# Patient Record
Sex: FEMALE | Race: WHITE
[De-identification: names, ages, dates, MRNs, and addresses within clinical notes are randomized per-mention and may not be internally consistent; named-entity substitution may affect disease eponyms.]

---

## 2022-01-21 ENCOUNTER — HOSPITAL ENCOUNTER (OUTPATIENT)
Dept: HOSPITAL 75 - RAD | Age: 72
End: 2022-01-21
Attending: NURSE PRACTITIONER
Payer: MEDICARE

## 2022-01-21 DIAGNOSIS — R41.841: Primary | ICD-10-CM

## 2022-01-21 LAB
ALBUMIN SERPL-MCNC: 4.1 GM/DL (ref 3.2–4.5)
ALP SERPL-CCNC: 84 U/L (ref 40–136)
ALT SERPL-CCNC: 31 U/L (ref 0–55)
BILIRUB SERPL-MCNC: 0.4 MG/DL (ref 0.1–1)
BUN/CREAT SERPL: 28
CALCIUM SERPL-MCNC: 9.2 MG/DL (ref 8.5–10.1)
CHLORIDE SERPL-SCNC: 103 MMOL/L (ref 98–107)
CO2 SERPL-SCNC: 24 MMOL/L (ref 21–32)
CREAT SERPL-MCNC: 0.81 MG/DL (ref 0.6–1.3)
GFR SERPLBLD BASED ON 1.73 SQ M-ARVRAT: 77 ML/MIN
GLUCOSE SERPL-MCNC: 155 MG/DL (ref 70–105)
HCT VFR BLD CALC: 44 % (ref 35–52)
HGB BLD-MCNC: 14.6 G/DL (ref 11.5–16)
MCH RBC QN AUTO: 32 PG (ref 25–34)
MCHC RBC AUTO-ENTMCNC: 33 G/DL (ref 32–36)
MCV RBC AUTO: 96 FL (ref 80–99)
PLATELET # BLD: 278 10^3/UL (ref 130–400)
PMV BLD AUTO: 9.3 FL (ref 9–12.2)
POTASSIUM SERPL-SCNC: 3.5 MMOL/L (ref 3.6–5)
PROT SERPL-MCNC: 7.3 GM/DL (ref 6.4–8.2)
SODIUM SERPL-SCNC: 138 MMOL/L (ref 135–145)
WBC # BLD AUTO: 6.8 10^3/UL (ref 4.3–11)

## 2022-01-21 PROCEDURE — 71046 X-RAY EXAM CHEST 2 VIEWS: CPT

## 2022-01-21 PROCEDURE — 85027 COMPLETE CBC AUTOMATED: CPT

## 2022-01-21 PROCEDURE — 80053 COMPREHEN METABOLIC PANEL: CPT

## 2022-01-21 PROCEDURE — 36415 COLL VENOUS BLD VENIPUNCTURE: CPT

## 2022-01-21 PROCEDURE — 87804 INFLUENZA ASSAY W/OPTIC: CPT

## 2022-01-21 NOTE — DIAGNOSTIC IMAGING REPORT
INDICATION:  DECREASED COGNITION.  



TECHNIQUE:  Two view chest   4:50 PM.



CORRELATION STUDY:   None



FINDINGS: 

The heart size, mediastinal configuration and pulmonary

vasculature are within normal limits. Electronic device over the

left anterior lower chest.

The lungs are clear with no consolidating infiltrate. There is no

significant pleural effusion or pneumothorax.  

Rightward curvature of the thoracolumbar spine with degenerative

change.



IMPRESSION: 

1. Negative for acute abnormality of the chest.



Dictated by: 



  Dictated on workstation # [id number]

## 2022-01-23 ENCOUNTER — HOSPITAL ENCOUNTER (EMERGENCY)
Dept: HOSPITAL 75 - ER | Age: 72
Discharge: HOME | End: 2022-01-23
Payer: MEDICARE

## 2022-01-23 VITALS — BODY MASS INDEX: 26.95 KG/M2 | HEIGHT: 62.99 IN | WEIGHT: 152.12 LBS | DIASTOLIC BLOOD PRESSURE: 61 MMHG

## 2022-01-23 VITALS — SYSTOLIC BLOOD PRESSURE: 61 MMHG

## 2022-01-23 DIAGNOSIS — M25.561: Primary | ICD-10-CM

## 2022-01-23 LAB
ALBUMIN SERPL-MCNC: 3.4 GM/DL (ref 3.2–4.5)
ALP SERPL-CCNC: 83 U/L (ref 40–136)
ALT SERPL-CCNC: 19 U/L (ref 0–55)
APTT BLD: 29 SEC (ref 24–35)
APTT PPP: YELLOW S
BACTERIA #/AREA URNS HPF: NEGATIVE /HPF
BASOPHILS # BLD AUTO: 0 10^3/UL (ref 0–0.1)
BASOPHILS NFR BLD AUTO: 0 % (ref 0–10)
BILIRUB SERPL-MCNC: 0.6 MG/DL (ref 0.1–1)
BILIRUB UR QL STRIP: NEGATIVE
BUN/CREAT SERPL: 15
CALCIUM SERPL-MCNC: 8.4 MG/DL (ref 8.5–10.1)
CHLORIDE SERPL-SCNC: 110 MMOL/L (ref 98–107)
CO2 SERPL-SCNC: 23 MMOL/L (ref 21–32)
CREAT SERPL-MCNC: 0.74 MG/DL (ref 0.6–1.3)
EOSINOPHIL # BLD AUTO: 0.2 10^3/UL (ref 0–0.3)
EOSINOPHIL NFR BLD AUTO: 3 % (ref 0–10)
FIBRINOGEN PPP-MCNC: CLEAR MG/DL
GFR SERPLBLD BASED ON 1.73 SQ M-ARVRAT: 86 ML/MIN
GLUCOSE SERPL-MCNC: 124 MG/DL (ref 70–105)
GLUCOSE UR STRIP-MCNC: NEGATIVE MG/DL
HCT VFR BLD CALC: 43 % (ref 35–52)
HGB BLD-MCNC: 14.2 G/DL (ref 11.5–16)
INR PPP: 1 (ref 0.8–1.4)
KETONES UR QL STRIP: NEGATIVE
LEUKOCYTE ESTERASE UR QL STRIP: NEGATIVE
LYMPHOCYTES # BLD AUTO: 2.3 X 10^3 (ref 1–4)
LYMPHOCYTES NFR BLD AUTO: 36 % (ref 12–44)
MANUAL DIFFERENTIAL PERFORMED BLD QL: NO
MCH RBC QN AUTO: 32 PG (ref 25–34)
MCHC RBC AUTO-ENTMCNC: 33 G/DL (ref 32–36)
MCV RBC AUTO: 97 FL (ref 80–99)
MONOCYTES # BLD AUTO: 0.6 X 10^3 (ref 0–1)
MONOCYTES NFR BLD AUTO: 10 % (ref 0–12)
NEUTROPHILS # BLD AUTO: 3.2 X 10^3 (ref 1.8–7.8)
NEUTROPHILS NFR BLD AUTO: 50 % (ref 42–75)
NITRITE UR QL STRIP: NEGATIVE
PH UR STRIP: 6 [PH] (ref 5–9)
PLATELET # BLD: 319 10^3/UL (ref 130–400)
PMV BLD AUTO: 9.2 FL (ref 9–12.2)
POTASSIUM SERPL-SCNC: 3.5 MMOL/L (ref 3.6–5)
PROT SERPL-MCNC: 6.2 GM/DL (ref 6.4–8.2)
PROT UR QL STRIP: NEGATIVE
PROTHROMBIN TIME: 13.8 SEC (ref 12.2–14.7)
RBC #/AREA URNS HPF: (no result) /HPF
SODIUM SERPL-SCNC: 144 MMOL/L (ref 135–145)
SP GR UR STRIP: 1.02 (ref 1.02–1.02)
SQUAMOUS #/AREA URNS HPF: (no result) /HPF
WBC # BLD AUTO: 6.4 10^3/UL (ref 4.3–11)
WBC #/AREA URNS HPF: (no result) /HPF

## 2022-01-23 PROCEDURE — 87088 URINE BACTERIA CULTURE: CPT

## 2022-01-23 PROCEDURE — 87040 BLOOD CULTURE FOR BACTERIA: CPT

## 2022-01-23 PROCEDURE — 80053 COMPREHEN METABOLIC PANEL: CPT

## 2022-01-23 PROCEDURE — 85025 COMPLETE CBC W/AUTO DIFF WBC: CPT

## 2022-01-23 PROCEDURE — 81000 URINALYSIS NONAUTO W/SCOPE: CPT

## 2022-01-23 PROCEDURE — 71045 X-RAY EXAM CHEST 1 VIEW: CPT

## 2022-01-23 PROCEDURE — 83605 ASSAY OF LACTIC ACID: CPT

## 2022-01-23 PROCEDURE — 85610 PROTHROMBIN TIME: CPT

## 2022-01-23 PROCEDURE — 73562 X-RAY EXAM OF KNEE 3: CPT

## 2022-01-23 PROCEDURE — 70450 CT HEAD/BRAIN W/O DYE: CPT

## 2022-01-23 PROCEDURE — 85730 THROMBOPLASTIN TIME PARTIAL: CPT

## 2022-01-23 PROCEDURE — 51701 INSERT BLADDER CATHETER: CPT

## 2022-01-23 PROCEDURE — 36415 COLL VENOUS BLD VENIPUNCTURE: CPT

## 2022-01-23 NOTE — XMS REPORT
CCD document using C-CDA

                             Created on: 2022



Alexia Bassett

External Reference #: [id number]

: 1950

Sex: Female



Demographics





                          Address                   [address]

[place]  [address]

 

                          Home Phone                [phone]

 

                          Preferred Language        Unknown

 

                          Marital Status            Unknown

 

                          Mandaen Affiliation     Unknown

 

                          Race                      White

 

                          Ethnic Group              Not  or 





Author





                          Author                    Alexia Barron

 

                          Organization              Cristin Huynh MD, [facility]

 

                          Address                   [address] [place]  [address]



 

                          Phone                     [phone]







Care Team Providers





                    Care Team Member Name Role                Phone

 

                          PP                        Unavailable

 

                          CCM                       Unavailable







Summary Purpose

Interface Exchange



Insurance Providers





             Payer name   Policy type / Coverage type Covered party ID Effective

 Begin Date 

Effective End Date

 

             [employer] Medicare Part B Medicare Part B [id number]  Unknown      Unkno

wn

 

             [facility] [employer] B[employer]EFIT PLAN Medicare Part B [id number]    Unknown      

Unknown







Family history





Sister



                          Diagnosis                 Age At Onset

 

                          Asthma                    Unknown

 

                          Breast cancer             Unknown

 

                          Diabetes mellitus Type 2  Unknown







Father



                          Diagnosis                 Age At Onset

 

                          Alcoholism                Unknown







Social History





                Social History Element Codes           Description     Effective

 Dates

 

                Marital status  Unknown                  2022

 

                Number of children Unknown         0               2022

 

                    Employment          Unknown             Retired

                            2022

 

                Tobacco history SNOMED CT: 691645432 Never smoker    2022

 

                Alcohol history Unknown         occasionally drinks alcohol 2022







Allergies, Adverse Reactions, Alerts





           Substance  Reaction   Codes      Entered Date Inactivated Date Status

 

           * NO KNOWN DRUG ALLERGIES            Unknown    2022 No Inactiv

e Date Active







Problems





                Condition       Codes           Effective Dates Condition Status

 

                          Cerebral vascular accident ICD-10: I63.9

ICD-9: 434.91             2022                Active

 

                          CISCO (generalized anxiety disorder) ICD-10: F41.1

ICD-9: 300.02             2022                Active

 

                          Resting tremor            ICD-10: G25.2

ICD-9: 781.0              2022                Active

 

                          Slow transit constipation ICD-10: K59.01

ICD-9: 564.01             2022                Active

 

                          Vascular dementia         ICD-10: F01.50

ICD-9: 290.40             2022                Active







Medications





          Medication Codes     Instructions Start Date Stop Date Status    Fill 

Instructions

 

             Remeron 15 mg tablet RxNorm: 338404 Take 1 Tablet(s) Oral every day

 2022   

No Stop Date              Active                     

 

                valacyclovir 500 mg tablet RxNorm: 278836  Take 1 Tablet(s) Oral

 every day 

2022          No Stop Date        Active               

 

          Milk of Magnesia oral RxNorm: 6581 oral      2022           Acti

ve     







Medication Administered

No Medication Administered data



Immunizations

No Immunization data



Results

No Results data



Procedures

No Procedures data



Vital Signs





                          Date                      Vital

 

                2022      Blood Pressure 1: 115/68 Code: 8480-6 BMI: 25.0 

Code: 93348-8 Heart 

Rate 1: 79 bpm      Height: 5'3" Code: 8302-2 SpO2: 98%           Temperature: 3

6.2 (C) / 97.2 

(F)                                     Weight: 141 lbs  Code: 35329-1







Functional Status

No Functional Status data



Reason For Visit





                    Reason For Visit    Effective Dates     Notes

 

                    well woman exam (65+ years) 2022           







Encounters





             Encounter    Performer    Location     Codes        Date

 

                                        () OFFICE  VISIT, NEW - LEVEL 4

Diagnosis: Cerebral vascular accident[ICD10: I63.9]

Diagnosis: Vascular dementia[ICD10: F01.50]

Diagnosis: Resting tremor[ICD10: G25.2]

Diagnosis: Slow transit constipation[ICD10: K59.01]

Diagnosis: CISCO (generalized anxiety disorder)[ICD10: F41.1] [facility]          CPT-4: 58764              2022







Plan of Care





             Planned Activity Notes        Codes        Status       Date

 

                          Visit Plan:               CVA - pt and family not awar

e of the underlying cause of stroke. 

Discussed importance of receiving records from [facility] and previous 
providers. Discussed normal CVA prevention of blood pressure control, statin 
use, and anticoagulant use. Nephew is pt POA now and unsure of the history as he
has recently overtaken pt care. Pt in the process of moving from [facility] 
[facility] in [place] to [facility], plan for 2022. Will obtain fasting
labs and previous records Vascular Dementia with anxiety/behaviors- secondary to
stroke. Family denies recent decline or changes in memory. Over 8 months ago had
significant behavioral issues and insomnia, was hospitalized and started on 
Remeron. Resolution of symptoms with Remeron. Resting Tremor - pt and nephew 
deny history of diagnosis of Parkinson's disease. Reports having resting tremor 
for some time. Discussed need to obtain previous records before proceeding with 
treatment. 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 
Constipation - no current issue, but recently with issue resolved with MOM. 
Encouraged increased water and fiber intake. Preventative Care - pt memory poor,
poor historian of medical care and unsure when last PAP, mammogram, labs, DXA, 
or colonoscopy was. Discussed obtaining previous records and will discuss at 
next visit. Michelle WILLIAMSON - [phone] cell at [facility]

                                                            2022

 

             Patient Education: Patient Medication Summary                      

     Completed    2022

 

             Care Plan: Lipid                           Pending      2022

 

                    Care Plan: %Hba1C                       LOINC : 86415-2

                          Pending                   2022

 

             Care Plan: Cbc With Differential                           Pending 

     2022

 

             Care Plan: Comp Metabolic                           Pending      

 

             Care Plan: B12                           Pending      2022

 

             Care Plan: Tsh                           Pending      2022







Instructions





                          Comment                   Date

 

                                        SIGN RELEASE OF RECORDS TO PREVIOUS HOSP

ITAL AND PHYSICIAN - [place], DR RAINA SMITH

FASTING LABS ORDERS GIVEN TO PT TO HAVE DRAWN AT [facility] IN [place]

                                        . CVA - pt and family not aware of the u

nderlying cause of stroke. Discussed 

importance of receiving records from  and previous providers. 
Discussed normal CVA prevention of blood pressure control, statin use, and 
anticoagulant use. Nephew is pt POA now and unsure of the history as he has 
recently overtaken pt care. Pt in the process of moving from [facility] 
in [place] to [facility], plan for 2022.  Will obtain fasting labs
and previous records



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 Family denies 
recent decline or changes in memory. Over 8 months ago had significant 
behavioral issues and insomnia, was hospitalized and started on Remeron. 
Resolution of symptoms with Remeron. 



Resting Tremor - pt and nephew deny history of diagnosis of Parkinson's disease.
Reports having resting tremor for some time. Discussed need to obtain previous 
records before proceeding with treatment. 



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 



Constipation - no current issue, but recently with issue resolved with MOM. 
Encouraged increased water and fiber intake.



Preventative Care - pt memory poor, poor historian of medical care and unsure 
when last PAP, mammogram, labs, DXA, or colonoscopy was. Discussed obtaining 
previous records and will discuss at next visit. 





Michelle WILLIAMSON - [phone] cell at [facility]



                                        2022







Medical Equipment

No Medical Equipment data



Health Concerns Section

Health Concerns data not found



Goals Section

Goals data not found



Interventions Section

Interventions data not found



Health Status Evaluations/Outcomes Section

Health Status Evaluations/Outcomes data not found



Advance Directives

No Advance Directive data

## 2022-01-23 NOTE — ED FALL/INJURY
General


Chief Complaint:  Trauma-Non Activation


Stated Complaint:  FALL


Nursing Triage Note:  


ARRIVED VIA EMS FROM [facility] AFTER FALLING AND HURTING HER RIGHT KNEE.  


DAUGHTER CONCERED WITH A STROKE.  PT IS FLU A.


Source:  patient, nursing home records


Exam Limitations:  no limitations


 (ANAY GUADALUPE)





History of Present Illness


Date Seen by Provider:  2022


Time Seen by Provider:  14:52


Initial Comments


To ER by EMS from [facility] with reports of right knee pain after she 

fell on 2022.  Patient is a poor historian unable to contribute to history 

of present illness.  She does have flu a positive.  We are not sure why she fell

2 days ago.


Occurred:  this evening


Severity:  moderate


Injuries/Pain Location:  lower extremity


Context:  unknown


Loss of Consciousness:  unsure


Associated Symptoms (Fall):  Confusion (ANAY GUADALUPE)





Allergies and Home Medications


Allergies


Coded Allergies:  


     No Known Drug Allergies (Unverified , 22)





Patient Home Medication List


Home Medication List Reviewed:  Yes


 (ANAY GUADALUPE)





Review of Systems


Review of Systems


Constitutional:  see HPI


Eyes:  No Symptoms Reported


Ears, Nose, Mouth, Throat:  no symptoms reported


Respiratory:  no symptoms reported


Cardiovascular:  no symptoms reported


Genitourinary:  no symptoms reported


Musculoskeletal:  see HPI


Skin:  no symptoms reported (ANAY GUADALUPE)





Physical Exam


Vital Signs





Vital Signs - First Documented








 22





 14:29


 


Temp 36.1


 


Pulse 62


 


Resp 16


 


B/P (MAP) 108/61 (77)


 


Pulse Ox 95


 


O2 Delivery Room Air








 (DREA SCOTT MD)


Vital Signs


Capillary Refill : Less Than 3 Seconds 


 (ANAY GUADALUPE)


Height, Weight, BMI


Height: '"


Weight: lbs. oz. kg; 26.00 BMI


Method:


General Appearance:  WD/WN, no apparent distress, other (Somewhat lethargic, 

vitals are stable.  Blood pressure 110 over 60s.  Heart rate 58 sinus.  

Respiratory rate 18.  She is alert but disoriented, she knows she is at the 

hospital but believes the year is .  Her right knee has a normal appearance 

nontender to palpation over the anterior medial and lateral aspect but tender to

palpation over the posterior aspect and becomes painful with range of motion.  

There is no swelling or ecchymosis.)


HEENT:  PERRL/EOMI, normal ENT inspection


Neck:  non-tender, full range of motion


Respiratory:  no respiratory distress, no accessory muscle use


Gastrointestinal:  normal bowel sounds, non tender


Extremities:  non-tender, normal capillary refill


Neurologic/Psychiatric:  alert, normal mood/affect, oriented x 3


Skin:  normal color, warm/dry (ANAY GUADALUPE)





[place] Coma Score


Best Eye Response:  (4) Open Spontaneously


Best Verbal Response:  (4) Confused Conversation


Best Motor Response:  (6) Obeys Commands


[place] Total:  15


 (ANAY GUADALUPE)





Progress/Results/Core Measures


Results/Orders


Lab Results





Laboratory Tests








Test


 22


14:44 22


15:20 Range/Units


 


 


White Blood Count


 6.4 


 


 4.3-11.0


10^3/uL


 


Red Blood Count


 4.40 


 


 3.80-5.11


10^6/uL


 


Hemoglobin 14.2   11.5-16.0  g/dL


 


Hematocrit 43   35-52  %


 


Mean Corpuscular Volume 97   80-99  fL


 


Mean Corpuscular Hemoglobin 32   25-34  pg


 


Mean Corpuscular Hemoglobin


Concent 33 


 


 32-36  g/dL





 


Red Cell Distribution Width 12.9   10.0-14.5  %


 


Platelet Count


 319 


 


 130-400


10^3/uL


 


Mean Platelet Volume 9.2   9.0-12.2  fL


 


Immature Granulocyte % (Auto) 1    %


 


Neutrophils (%) (Auto) 50   42-75  %


 


Lymphocytes (%) (Auto) 36   12-44  %


 


Monocytes (%) (Auto) 10   0-12  %


 


Eosinophils (%) (Auto) 3   0-10  %


 


Basophils (%) (Auto) 0   0-10  %


 


Neutrophils # (Auto) 3.2   1.8-7.8  X 10^3


 


Lymphocytes # (Auto) 2.3   1.0-4.0  X 10^3


 


Monocytes # (Auto) 0.6   0.0-1.0  X 10^3


 


Eosinophils # (Auto)


 0.2 


 


 0.0-0.3


10^3/uL


 


Basophils # (Auto)


 0.0 


 


 0.0-0.1


10^3/uL


 


Immature Granulocyte # (Auto)


 0.1 


 


 0.0-0.1


10^3/uL


 


Prothrombin Time 13.8   12.2-14.7  SEC


 


INR Comment 1.0   0.8-1.4  


 


Activated Partial


Thromboplast Time 29 


 


 24-35  SEC





 


Sodium Level 144   135-145  MMOL/L


 


Potassium Level 3.5 L  3.6-5.0  MMOL/L


 


Chloride Level 110 H    MMOL/L


 


Carbon Dioxide Level 23   21-32  MMOL/L


 


Anion Gap 11   5-14  MMOL/L


 


Blood Urea Nitrogen 11   7-18  MG/DL


 


Creatinine


 0.74 


 


 0.60-1.30


MG/DL


 


Estimat Glomerular Filtration


Rate 86 


 


  





 


BUN/Creatinine Ratio 15    


 


Glucose Level 124 H    MG/DL


 


Lactic Acid Level


 0.84 


 


 0.50-2.00


MMOL/L


 


Calcium Level 8.4 L  8.5-10.1  MG/DL


 


Corrected Calcium 8.9   8.5-10.1  MG/DL


 


Total Bilirubin 0.6   0.1-1.0  MG/DL


 


Aspartate Amino Transf


(AST/SGOT) 22 


 


 5-34  U/L





 


Alanine Aminotransferase


(ALT/SGPT) 19 


 


 0-55  U/L





 


Alkaline Phosphatase 83     U/L


 


Total Protein 6.2 L  6.4-8.2  GM/DL


 


Albumin 3.4   3.2-4.5  GM/DL


 


Urine Color  YELLOW   


 


Urine Clarity  CLEAR   


 


Urine pH  6.0  5-9  


 


Urine Specific Gravity  1.020  1.016-1.022  


 


Urine Protein  NEGATIVE  NEGATIVE  


 


Urine Glucose (UA)  NEGATIVE  NEGATIVE  


 


Urine Ketones  NEGATIVE  NEGATIVE  


 


Urine Nitrite  NEGATIVE  NEGATIVE  


 


Urine Bilirubin  NEGATIVE  NEGATIVE  


 


Urine Urobilinogen  0.2  < = 1.0  MG/DL


 


Urine Leukocyte Esterase  NEGATIVE  NEGATIVE  


 


Urine RBC (Auto)  TRACE-I H NEGATIVE  


 


Urine RBC  NONE   /HPF


 


Urine WBC  0-2   /HPF


 


Urine Squamous Epithelial


Cells 


 0-2 


  /HPF





 


Urine Crystals  NONE   /LPF


 


Urine Bacteria  NEGATIVE   /HPF


 


Urine Casts  NONE   /LPF


 


Urine Mucus  NEGATIVE   /LPF


 


Urine Culture Indicated  NO   





 (DREA SCOTT MD)


Medications Given in ED





Current Medications








 Medications  Dose


 Ordered  Sig/Cely


 Route  Start Time


 Stop Time Status Last Admin


Dose Admin


 


 Ketorolac


 Tromethamine  15 mg  ONCE  ONCE


 IVP  22 15:00


 22 15:01 DC 22 14:55


15 MG





 (DREA SCOTT MD)


Vital Signs/I&O











 22





 14:29 16:18


 


Temp 36.1 


 


Pulse 62 59


 


Resp 16 16


 


B/P (MAP) 108/61 (77) 61/


 


Pulse Ox 95 98


 


O2 Delivery Room Air Room Air





 (DREA SCOTT MD)








Blood Pressure Mean:                    77











Focused Exam


Lactate Level


22 14:44: Lactic Acid Level 0.84


 (DREA SCOTT MD)


Lactic Acid Level





Laboratory Tests








Test


 22


14:44


 


Lactic Acid Level


 0.84 MMOL/L


(0.50-2.00)





 (DREA SCOTT MD)





Departure


Communication (Admissions)


NAME:   ARTHUR REYES


MED REC#:   [id number]


ACCOUNT#:   [id number]


PT STATUS:   REG ER


:   1950


PHYSICIAN:   ANAY GUADALUPE


ADMIT DATE:   22/ER


                                   ***Draft***


Date of Exam:22





CT HEAD WO








PROCEDURE: CT head without contrast.





TECHNIQUE: Multiple contiguous axial images were obtained through


the brain without the use of intravenous contrast. Auto Exposure


Controls were utilized during the CT exam to meet ALARA standards


for radiation dose reduction. 





INDICATION: Fall.





COMPARISON: Prior CT head from 2019.





FINDINGS: The ventricles remain prominent. There is extensive


periventricular low-attenuation consistent with chronic


microvascular ischemia. Encephalomalacia in the right posterior


parietal lobe is again noted. There is some encephalomalacia in


the left temporal lobe. No sulcal effacement or midline shift is


identified. No acute intra-axial or extra-axial hemorrhage is


detected. Cisterns are patent. Visualized paranasal sinuses


demonstrate mucosal thickening and opacification of multiple


ethmoid air cells. There is mucosal thickening in bilateral


maxillary sinuses as well as the frontal sinus.





IMPRESSION:


1. Chronic changes stable since 2019. No acute


intracranial process detected.


2. Paranasal sinus mucosal disease.





  Dictated on workstation # [id number]








Dict:   22 1540


Trans:   22 1544


[facility] [id number]





Interpreted by:     SHAWANDA RESTREPO MD


Electronically signed by:  


NAME:   ARTHUR REYES


MED REC#:   [id number]


ACCOUNT#:   [id number]


PT STATUS:   REG ER


:   1950


PHYSICIAN:   ANAY GUADALUPE


ADMIT DATE:   22/ER


                                   ***Draft***


Date of Exam:22





KNEE, RIGHT, 3 VIEWS








INDICATION: Fall with right knee pain.





TIME OF EXAM: 3:41 PM.





EXAMINATION: Three views of the right knee were obtained.





There are mild tricompartmental degenerative changes. Articular


surfaces are smooth. No fracture, dislocation or effusion is


seen.





IMPRESSION: Degenerative changes. No acute bony abnormality is


detected. 





  Dictated on workstation # [id number]








Dict:   22 1544


Trans:   22 1546


[facility] [id number]





Interpreted by:     SHAWANDA RESTREPO MD


Electronically signed by:  


NAME:   ARTHUR REYES


MED REC#:   [id number]


ACCOUNT#:   [id number]


PT STATUS:   REG ER


:   1950


PHYSICIAN:   ANAY GUADALUPE


ADMIT DATE:   22/ER


                                   ***Draft***


Date of Exam:22





CHEST 1 VIEW, AP/PA ONLY








INDICATION: Fall.





TIME OF EXAM: 3:40 PM.





COMPARISON: Prior chest from 2022.





Cardiac loop recorder overlies the mid left chest. Heart size is


normal. Lungs are clear. No infiltrates are seen. There is no


effusion or pneumothorax.





IMPRESSION: No acute cardiopulmonary process is detected. 





  Dictated on workstation # [id number]








Dict:   22 1543


Trans:   22 1546


[facility] [id number]





Interpreted by:     SHAWANDA RESTREPO MD


Electronically signed by:  


 (ANAY GUADALUPE)





Impression





   Primary Impression:  


   Right knee pain


Disposition:   HOME, SELF-CARE


Condition:  Stable





Departure-Patient Inst.


Decision time for Depature:  15:52


 (ANAY GUADALUPE)


Patient Instructions:  Knee Pain





Add. Discharge Instructions:  


1.  Return to ER for any concerns.  Follow-up with her doctor later this week 

for recheck.





All discharge instructions reviewed with patient and/or family. Voiced 

understanding.








ATTENDING PHYSICIAN NOTE:


I was physically present as attending physician in the emergency department 

during the care of this patient, but I was not directly involved in the decision

making or delivery of care for this patient. 


 (DREA SCOTT MD)











ANAY GUADALUPE             2022 14:54


DREA SCOTT MD        2022 19:11

## 2022-01-23 NOTE — XMS REPORT
CCD document using C-CDA

                             Created on: 2022



Alexia Bassett

External Reference #: [id number]

: 1950

Sex: Female



Demographics





                          Address                   [address]

[place]  [address]

 

                          Home Phone                [phone]

 

                          Preferred Language        Unknown

 

                          Marital Status            Unknown

 

                          Gnosticism Affiliation     Unknown

 

                          Race                      White

 

                          Ethnic Group              Not  or 





Author





                          Author                    Alexia Barron

 

                          Organization              [facility]

 

                          Address                   [address] [place]  [address]



 

                          Phone                     [phone]







Care Team Providers





                    Care Team Member Name Role                Phone

 

                          PP                        Unavailable

 

                          CCM                       Unavailable







Summary Purpose

Interface Exchange



Insurance Providers





             Payer name   Policy type / Coverage type Covered party ID Effective

 Begin Date 

Effective End Date

 

             [employer] Medicare Part B Medicare Part B [id number]  Unknown      Unkno

wn

 

             [facility] [employer] Medicare Part B [id number]    Unknown      

Unknown







Family history





Sister



                          Diagnosis                 Age At Onset

 

                          Asthma                    Unknown

 

                          Breast cancer             Unknown

 

                          Diabetes mellitus Type 2  Unknown







Father



                          Diagnosis                 Age At Onset

 

                          Alcoholism                Unknown







Social History





                Social History Element Codes           Description     Effective

 Dates

 

                Marital status  Unknown                  2022

 

                Number of children Unknown         0               2022

 

                    Employment          Unknown             Retired

                            2022

 

                Tobacco history SNOMED CT: 110787327 Never smoker    2022

 

                Alcohol history Unknown         occasionally drinks alcohol 2022







Allergies, Adverse Reactions, Alerts





           Substance  Reaction   Codes      Entered Date Inactivated Date Status

 

           * NO KNOWN DRUG ALLERGIES            Unknown    2022 No Inactiv

e Date Active







Problems





                Condition       Codes           Effective Dates Condition Status

 

                          Cerebral vascular accident ICD-10: I63.9

ICD-9: 434.91             2022                Active

 

                          Cough                     ICD-10: R05.9

ICD-9: 786.2              2022                Active

 

                          Resting tremor            ICD-10: G25.2

ICD-9: 781.0              2022                Active

 

                          Vascular dementia         ICD-10: F01.50

ICD-9: 290.40             2022                Active

 

                          Weakness generalized      ICD-10: R53.1

ICD-9: 780.79             2022                Active

 

                          CISCO (generalized anxiety disorder) ICD-10: F41.1

ICD-9: 300.02             2022                Active

 

                          Slow transit constipation ICD-10: K59.01

ICD-9: 564.2022                Active







Medications





          Medication Codes     Instructions Start Date Stop Date Status    Fill 

Instructions

 

                    memantine 10 mg tablet RxNorm: 332663      Take 1 Tablet(s) 

Oral every night at 

bedtime         2022      No Stop Date    Active           

 

                Synthroid 75 mcg tablet RxNorm: 898033  Take 1 Tablet(s) Oral ev

luis morning 

2022          No Stop Date        Active               

 

                Tamiflu 75 mg capsule RxNorm: 368670  Take 1 Capsule(s) Oral two

 times a day 

2022          Active               

 

                benztropine 1 mg tablet RxNorm: 099563  Take 1 Tablet(s) Oral ev

luis day 

2022          No Stop Date        Active               

 

                cefdinir 300 mg capsule RxNorm: 737569  Take 1 Capsule(s) Oral e

very day 

2022          Active              please deliver to [facility]

 

                haloperidol 0.5 mg tablet RxNorm: 675006  Take 1 Tablet(s) Oral 

two times a day 

2022          Active              discontinue the 1mg 

tablets

 

                haloperidol 1 mg tablet RxNorm: 894345  Take 1 Tablet(s) Oral tw

o times a day 

2022          Inactive             

 

                    Probiotic 100 billion cell capsule RxNorm:             Take 

1 Capsule(s) Oral two times a 

day             2022      Active          may substitute f

or any probiotic

 

                    cholecalciferol (vitamin D3) 25 mcg (1,000 unit) capsule RxN

orm: 129042      Take 1 

Capsule(s) Oral every day 2022      No Stop Date    Active           

 

                    benztropine 0.5 mg tablet RxNorm: 149164      Take 1 Tablet(

s) Oral every night at 

bedtime         2022      No Stop Date    Active           

 

                atorvastatin 20 mg tablet RxNorm: 601838  Take 1 Tablet(s) Oral 

every day 

2022          No Stop Date        Active               

 

                    latanoprost 0.005 % eye drops RxNorm: 401261      Instill 1 

Drop(s) ophthalmic (eye) 

every night at bedtime 2022      No Stop Date    Active           

 

                    melatonin 5 mg tablet RxNorm: 541175      Take 1 Tablet(s) O

ral every night at 

bedtime         2022      No Stop Date    Active           

 

             Remeron 15 mg tablet RxNorm: 441679 Take 1 Tablet(s) Oral every day

 2022   

No Stop Date              Active                     

 

                valacyclovir 500 mg tablet RxNorm: 343361  Take 1 Tablet(s) Oral

 every day 

2022          No Stop Date        Active               

 

          Milk of Magnesia oral RxNorm: 6581 oral      2022           Acti

ve     







Medication Administered

No Medication Administered data



Immunizations

No Immunization data



Results

No Results data



Procedures

No Procedures data



Vital Signs





                          Date                      Vital

 

                2022      Blood Pressure 1: 134/80 Code: 8480-6 BMI: 25.0 

Code: 59557-6 Heart 

Rate 1: 80 bpm      Height: 5'3" Code: 8302-2 SpO2: 96%           Temperature: 3

6.0 (C) / 96.8 

(F)                                     Weight: 141 lbs  Code: 48613-2

 

                2022      Blood Pressure 1: 115/68 Code: 8480-6 BMI: 25.0 

Code: 37072-5 Heart 

Rate 1: 79 bpm      Height: 5'3" Code: 8302-2 SpO2: 98%           Temperature: 3

6.2 (C) / 97.2 

(F)                                     Weight: 141 lbs  Code: 20886-9







Functional Status

No Functional Status data



Reason For Visit





                    Reason For Visit    Effective Dates     Notes

 

                    diarrhea            2022           

 

                    well woman exam (65+ years) 2022           







Encounters





             Encounter    Performer    Location     Codes        Date

 

                                        () 76273 EST. PATIENT, LEVEL IV

Diagnosis: Cerebral vascular accident[ICD10: I63.9]

Diagnosis: Vascular dementia[ICD10: F01.50]

Diagnosis: Resting tremor[ICD10: G25.2]

Diagnosis: Weakness generalized[ICD10: R53.1]

Diagnosis: Cough[ICD10: R05.9] Yumiko Huynh MD, [facility] CPT-4:

 39573

                                        2022

 

                                        (47712) OFFICE  VISIT, NEW - LEVEL 4

Diagnosis: Cerebral vascular accident[ICD10: I63.9]

Diagnosis: Vascular dementia[ICD10: F01.50]

Diagnosis: Resting tremor[ICD10: G25.2]

Diagnosis: Slow transit constipation[ICD10: K59.01]

Diagnosis: CISCO (generalized anxiety disorder)[ICD10: F41.1] [facility]          CPT-4: 53686              2022







Plan of Care





             Planned Activity Notes        Codes        Status       Date

 

                          Visit Plan:               CVA/Vascular dementia - obtu

nded during visit. Previously pt was 

not on haldol. Facility med list shows haldol 1mg BID. Family reports unaware of
any behaviors or why pt was on the medication. Decrease haldol to 0.5mg BID to 
see if improves solemence. Family discussed concerns with further stroke. 
Education provided on ruling out other etiologies at this time since symptoms 
are not consistent with new onset CVA. Weakness/Cough - COVID negative. 
Afebrile. Unable to obtain UA while in office. Pt with increased weakness and 
unable to toilet self. Discussed with [place]
eed assistance until weakness resolves or pt can be moved to skilled care. Will 
send for CXR, labs and flu swab now. Since unable to obtain UA will also treat 
for possible UTI due to late in the afternoon on a Friday. Cefdinir sent to 
pharmacy. Tremor unchanged.

                                                            2022

 

             Patient Education: Patient Medication Summary                      

     Completed    2022

 

                          Patient Education: cefdinir- [employer] Coupon [id number]

19 

https://www.[employer].Commissioner/samplemd/resources/getResource/61/069b3wn8-b5q3-5xd7-us

                                        Completed           2022

 

                          Patient Education: haloperidol- [employer] Coupon 1901

94015 

https://www.[employer].Commissioner/samplemd/resources/getResource/61/0339z01o-6571-3596-rl

                                        Completed           2022

 

                          Patient Education: Tamiflu- [employer] Coupon [id number]

6 

https://www.[employer].Commissioner/samplemd/resources/getResource/61/n44m9373-o3t4-0m45-y0

                                        Completed           2022

 

                          Visit Plan:               CVA - pt and family not awar

e of the underlying cause of stroke. 

Discussed importance of receiving records from [facility] and previous 
providers. Discussed normal CVA prevention of blood pressure control, statin 
use, and anticoagulant use. Nephew is pt POA now and unsure of the history as he
has recently overtaken pt care. Pt in the process of moving from [facility] in [place] to [facility], plan for 2022. Will obtain fasting
labs and previous records Vascular Dementia with anxiety/behaviors- secondary to
stroke. Family denies recent decline or changes in memory. Over 8 months ago had
significant behavioral issues and insomnia, was hospitalized and started on 
Remeron. Resolution of symptoms with Remeron. Resting Tremor - pt and nephew 
deny history of diagnosis of Parkinson's disease. Reports having resting tremor 
for some time. Discussed need to obtain previous records before proceeding with 
treatment. 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 
Constipation - no current issue, but recently with issue resolved with MOM. 
Encouraged increased water and fiber intake. Preventative Care - pt memory poor,
poor historian of medical care and unsure when last PAP, mammogram, labs, DXA, 
or colonoscopy was. Discussed obtaining previous records and will discuss at 
next visit. Michelle WILLIAMSON - [phone] cell at [facility]

                                                            2022

 

                                        Appointment: Yumiko Barron 

WPtel:[phone]

                                        [address] [place], [place] [id number]

                                              New Patient     2022

 

             Patient Education: Patient Medication Summary                      

     Completed    2022

 

             Care Plan: Lipid                           Pending      2022

 

                    Care Plan: %Hba1C                       LOINC : 78776-5

                          Pending                   2022

 

             Care Plan: Cbc With Differential                           Pending 

     2022

 

             Care Plan: Comp Metabolic                           Pending      

 

             Care Plan: B12                           Pending      2022

 

             Care Plan: Tsh                           Pending      2022







Instructions





                          Comment                   Date

 

                                        STRAIGHT CATH FOR U&A AND CULTURE, LABS 

AT HOSPITAL

PORTABLE CHEST XRAY

PROBIOTIC

DECREASE HALDOL TO 1/2 TABLET TWICE A DAY

                                        . CVA/Vascular dementia - obtunded durin

g visit. Previously pt was not on 

haldol. Facility med list shows haldol 1mg BID. Family reports unaware of any 
behaviors or why pt was on the medication. Decrease haldol to 0.5mg BID to see 
if improves solemence. Family discussed concerns with further stroke. Education 
provided on ruling out other etiologies at this time since symptoms are not 
consistent with new onset CVA. 



Weakness/Cough - COVID negative. Afebrile. Unable to obtain UA while in office. 
Pt with increased weakness and unable to toilet self. Discussed with [place] that
[place] will need assistance until weakness resolves or pt can be
moved to skilled care. Will send for CXR, labs and flu swab now. Since unable to
obtain UA will also treat for possible UTI due to late in the afternoon on a Fr
iday. Cefdinir sent to pharmacy. 



Tremor unchanged. 

                                        2022

 

                                        SIGN RELEASE OF RECORDS TO PREVIOUS HOSP

ITAL AND PHYSICIAN - [place], DR RAINA SMITH

FASTING LABS ORDERS GIVEN TO PT TO HAVE DRAWN AT [facility] IN [place]

                                        . CVA - pt and family not aware of the u

nderlying cause of stroke. Discussed 

importance of receiving records from [facility] and previous providers. 
Discussed normal CVA prevention of blood pressure control, statin use, and 
anticoagulant use. Nephew is pt POA now and unsure of the history as he has 
recently overtaken pt care. Pt in the process of moving from [facility] 
in [place] to [facility], plan for 2022.  Will obtain fasting labs
and previous records



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 Family denies 
recent decline or changes in memory. Over 8 months ago had significant 
behavioral issues and insomnia, was hospitalized and started on Remeron. 
Resolution of symptoms with Remeron. 



Resting Tremor - pt and nephew deny history of diagnosis of Parkinson's disease.
Reports having resting tremor for some time. Discussed need to obtain previous 
records before proceeding with treatment. 



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 



Constipation - no current issue, but recently with issue resolved with MOM. 
Encouraged increased water and fiber intake.



Preventative Care - pt memory poor, poor historian of medical care and unsure 
when last PAP, mammogram, labs, DXA, or colonoscopy was. Discussed obtaining 
previous records and will discuss at next visit. 





Michelle RN - [phone] cell at [facility]



                                        2022







Medical Equipment

No Medical Equipment data



Health Concerns Section

Health Concerns data not found



Goals Section

Goals data not found



Interventions Section

Interventions data not found



Health Status Evaluations/Outcomes Section

Health Status Evaluations/Outcomes data not found



Advance Directives

No Advance Directive data

## 2022-01-23 NOTE — DIAGNOSTIC IMAGING REPORT
INDICATION: Fall with right knee pain.



TIME OF EXAM: 3:41 PM.



EXAMINATION: Three views of the right knee were obtained.



There are mild tricompartmental degenerative changes. Articular

surfaces are smooth. No fracture, dislocation or effusion is

seen.



IMPRESSION: Degenerative changes. No acute bony abnormality is

detected. 



Dictated by: 



  Dictated on workstation # [id number]

## 2022-01-23 NOTE — XMS REPORT
CCD document using C-CDA

                             Created on: 2022



Alexia Bassett

External Reference #: [id number]

: 1950

Sex: Female



Demographics





                          Address                   [address]

[place]  [address]

 

                          Home Phone                [phone]

 

                          Preferred Language        Unknown

 

                          Marital Status            Unknown

 

                          Sikhism Affiliation     Unknown

 

                          Race                      White

 

                          Ethnic Group              Not  or 





Author





                          Author                    Alexia Barron

 

                          Organization              Cristin Huynh MD, [facility]

 

                          Address                   [address] [place]  [address]



 

                          Phone                     [phone]







Care Team Providers





                    Care Team Member Name Role                Phone

 

                          PP                        Unavailable

 

                          CCM                       Unavailable







Summary Purpose

Interface Exchange



Insurance Providers





             Payer name   Policy type / Coverage type Covered party ID Effective

 Begin Date 

Effective End Date

 

             [employer] Medicare Part B Medicare Part B [id number]  Unknown      Unkno

wn

 

             [facility] [employer] Medicare Part B [id number]    Unknown      

Unknown







Family history





Sister



                          Diagnosis                 Age At Onset

 

                          Asthma                    Unknown

 

                          Breast cancer             Unknown

 

                          Diabetes mellitus Type 2  Unknown







Father



                          Diagnosis                 Age At Onset

 

                          Alcoholism                Unknown







Social History





                Social History Element Codes           Description     Effective

 Dates

 

                Marital status  Unknown                  2022

 

                Number of children Unknown         0               2022

 

                    Employment          Unknown             Retired

                            2022

 

                Tobacco history SNOMED CT: 776977266 Never smoker    2022

 

                Alcohol history Unknown         occasionally drinks alcohol 2022







Allergies, Adverse Reactions, Alerts





           Substance  Reaction   Codes      Entered Date Inactivated Date Status

 

           * NO KNOWN DRUG ALLERGIES            Unknown    2022 No Inactiv

e Date Active







Problems





                Condition       Codes           Effective Dates Condition Status

 

                          Cerebral vascular accident ICD-10: I63.9

ICD-9: 434.91             2022                Active

 

                          CISCO (generalized anxiety disorder) ICD-10: F41.1

ICD-9: 300.02             2022                Active

 

                          Resting tremor            ICD-10: G25.2

ICD-9: 781.0              2022                Active

 

                          Slow transit constipation ICD-10: K59.01

ICD-9: 564.01             2022                Active

 

                          Vascular dementia         ICD-10: F01.50

ICD-9: 290.40             2022                Active







Medications





          Medication Codes     Instructions Start Date Stop Date Status    Fill 

Instructions

 

             Remeron 15 mg tablet RxNorm: 776949 Take 1 Tablet(s) Oral every day

 2022   

No Stop Date              Active                     

 

                valacyclovir 500 mg tablet RxNorm: 483367  Take 1 Tablet(s) Oral

 every day 

2022          No Stop Date        Active               

 

          Milk of Magnesia oral RxNorm: 6581 oral      2022           Acti

ve     







Medication Administered

No Medication Administered data



Immunizations

No Immunization data



Results

No Results data



Procedures

No Procedures data



Vital Signs





                          Date                      Vital

 

                2022      Blood Pressure 1: 115/68 Code: 8480-6 BMI: 25.0 

Code: 16128-7 Heart 

Rate 1: 79 bpm      Height: 5'3" Code: 8302-2 SpO2: 98%           Temperature: 3

6.2 (C) / 97.2 

(F)                                     Weight: 141 lbs  Code: 62324-5







Functional Status

No Functional Status data



Reason For Visit





                    Reason For Visit    Effective Dates     Notes

 

                    well woman exam (65+ years) 2022           







Encounters





             Encounter    Performer    Location     Codes        Date

 

                                        () OFFICE  VISIT, NEW - LEVEL 4

Diagnosis: Cerebral vascular accident[ICD10: I63.9]

Diagnosis: Vascular dementia[ICD10: F01.50]

Diagnosis: Resting tremor[ICD10: G25.2]

Diagnosis: Slow transit constipation[ICD10: K59.01]

Diagnosis: CISCO (generalized anxiety disorder)[ICD10: F41.1] [facility]          CPT-4: 46083              2022







Plan of Care





             Planned Activity Notes        Codes        Status       Date

 

                          Visit Plan:               CVA - pt and family not awar

e of the underlying cause of stroke. 

Discussed importance of receiving records from [facility] and previous 
providers. Discussed normal CVA prevention of blood pressure control, statin 
use, and anticoagulant use. Nephew is pt POA now and unsure of the history as he
has recently overtaken pt care. Pt in the process of moving from [facility] in [place] to [facility], plan for 2022. Will obtain fasting
labs and previous records Vascular Dementia with anxiety/behaviors- secondary to
stroke. Family denies recent decline or changes in memory. Over 8 months ago had
significant behavioral issues and insomnia, was hospitalized and started on 
Remeron. Resolution of symptoms with Remeron. Resting Tremor - pt and nephew 
deny history of diagnosis of Parkinson's disease. Reports having resting tremor 
for some time. Discussed need to obtain previous records before proceeding with 
treatment. 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 
Constipation - no current issue, but recently with issue resolved with MOM. 
Encouraged increased water and fiber intake. Preventative Care - pt memory poor,
poor historian of medical care and unsure when last PAP, mammogram, labs, DXA, 
or colonoscopy was. Discussed obtaining previous records and will discuss at 
next visit. Michelle WILLIAMSON - [phone] cell at [facility]

                                                            2022

 

                                        Appointment: Yumiko Barron 

WPtel:[phone]

                                        [address] [facility] [address] [address]

                                              New Patient     2022

 

             Patient Education: Patient Medication Summary                      

     Completed    2022

 

             Care Plan: Lipid                           Pending      2022

 

                    Care Plan: %Hba1C                       LOINC : 66394-0

                          Pending                   2022

 

             Care Plan: Cbc With Differential                           Pending 

     2022

 

             Care Plan: Comp Metabolic                           Pending      

 

             Care Plan: B12                           Pending      2022

 

             Care Plan: Tsh                           Pending      2022







Instructions





                          Comment                   Date

 

                                        SIGN RELEASE OF RECORDS TO PREVIOUS HOSP

ITAL AND PHYSICIAN - DR RAINA BOX

FASTING LABS ORDERS GIVEN TO PT TO HAVE DRAWN AT [facility] IN [place]

                                        . CVA - pt and family not aware of the u

nderlying cause of stroke. Discussed 

importance of receiving records from [facility] and previous providers. 
Discussed normal CVA prevention of blood pressure control, statin use, and 
anticoagulant use. Nephew is pt POA now and unsure of the history as he has 
recently overtaken pt care. Pt in the process of moving from [facility] 
in [place] to [facility], plan for 2022.  Will obtain fasting labs
and previous records



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 Family denies 
recent decline or changes in memory. Over 8 months ago had significant 
behavioral issues and insomnia, was hospitalized and started on Remeron. 
Resolution of symptoms with Remeron. 



Resting Tremor - pt and nephew deny history of diagnosis of Parkinson's disease.
Reports having resting tremor for some time. Discussed need to obtain previous 
records before proceeding with treatment. 



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 



Constipation - no current issue, but recently with issue resolved with MOM. 
Encouraged increased water and fiber intake.



Preventative Care - pt memory poor, poor historian of medical care and unsure 
when last PAP, mammogram, labs, DXA, or colonoscopy was. Discussed obtaining 
previous records and will discuss at next visit. 





Michelle WILLIAMSON - [phone] cell at [facility]



                                        2022







Medical Equipment

No Medical Equipment data



Health Concerns Section

Health Concerns data not found



Goals Section

Goals data not found



Interventions Section

Interventions data not found



Health Status Evaluations/Outcomes Section

Health Status Evaluations/Outcomes data not found



Advance Directives

No Advance Directive data

## 2022-01-23 NOTE — DIAGNOSTIC IMAGING REPORT
INDICATION: Fall.



TIME OF EXAM: 3:40 PM.



COMPARISON: Prior chest from 01/21/2022.



Cardiac loop recorder overlies the mid left chest. Heart size is

normal. Lungs are clear. No infiltrates are seen. There is no

effusion or pneumothorax.



IMPRESSION: No acute cardiopulmonary process is detected. 



Dictated by: 



  Dictated on workstation # [id number]

## 2022-01-23 NOTE — DIAGNOSTIC IMAGING REPORT
PROCEDURE: CT head without contrast.



TECHNIQUE: Multiple contiguous axial images were obtained through

the brain without the use of intravenous contrast. Auto Exposure

Controls were utilized during the CT exam to meet ALARA standards

for radiation dose reduction. 



INDICATION: Fall.



COMPARISON: Prior CT head from 11/01/2019.



FINDINGS: The ventricles remain prominent. There is extensive

periventricular low-attenuation consistent with chronic

microvascular ischemia. Encephalomalacia in the right posterior

parietal lobe is again noted. There is some encephalomalacia in

the left temporal lobe. No sulcal effacement or midline shift is

identified. No acute intra-axial or extra-axial hemorrhage is

detected. Cisterns are patent. Visualized paranasal sinuses

demonstrate mucosal thickening and opacification of multiple

ethmoid air cells. There is mucosal thickening in bilateral

maxillary sinuses as well as the frontal sinus.



IMPRESSION:

1. Chronic changes stable since November 2019. No acute

intracranial process detected.

2. Paranasal sinus mucosal disease.



Dictated by: 



  Dictated on workstation # [id number]

## 2022-01-23 NOTE — XMS REPORT
CCD document using C-CDA

                             Created on: 2022



Alexia Bassett

External Reference #: [id number]

: 1950

Sex: Female



Demographics





                          Address                   [address]

[place]  [address]

 

                          Home Phone                [phone]

 

                          Preferred Language        Unknown

 

                          Marital Status            Unknown

 

                          Restorationist Affiliation     Unknown

 

                          Race                      White

 

                          Ethnic Group              Not  or 





Author





                          Author                    Alexia Barron

 

                          Organization              Cristin uHynh MD, [facility]

 

                          Address                   [address] [place]  [address]



 

                          Phone                     [phone]







Care Team Providers





                    Care Team Member Name Role                Phone

 

                          PP                        Unavailable

 

                          CCM                       Unavailable







Summary Purpose

Interface Exchange



Insurance Providers





             Payer name   Policy type / Coverage type Covered party ID Effective

 Begin Date 

Effective End Date

 

             [employer] Medicare Part B Medicare Part B [id number]  Unknown      Unkno

wn

 

             [employer] Medicare Part B [id number]    Unknown      

Unknown







Family history





Sister



                          Diagnosis                 Age At Onset

 

                          Asthma                    Unknown

 

                          Breast cancer             Unknown

 

                          Diabetes mellitus Type 2  Unknown







Father



                          Diagnosis                 Age At Onset

 

                          Alcoholism                Unknown







Social History





                Social History Element Codes           Description     Effective

 Dates

 

                Marital status  Unknown                  2022

 

                Number of children Unknown         0               2022

 

                    Employment          Unknown             Retired

                            2022

 

                Tobacco history SNOMED CT: 828695722 Never smoker    2022

 

                Alcohol history Unknown         occasionally drinks alcohol 2022







Allergies, Adverse Reactions, Alerts





           Substance  Reaction   Codes      Entered Date Inactivated Date Status

 

           * NO KNOWN DRUG ALLERGIES            Unknown    2022 No Inactiv

e Date Active







Problems





                Condition       Codes           Effective Dates Condition Status

 

                          Cerebral vascular accident ICD-10: I63.9

ICD-9: 434.91             2022                Active

 

                          Cough                     ICD-10: R05.9

ICD-9: 786.2              2022                Active

 

                          Influenza A               ICD-10: J10.1

ICD-9: 487.1              2022                Active

 

                          Resting tremor            ICD-10: G25.2

ICD-9: 781.0              2022                Active

 

                          Vascular dementia         ICD-10: F01.50

ICD-9: 290.40             2022                Active

 

                          Weakness generalized      ICD-10: R53.1

ICD-9: 780.79             2022                Active

 

                          CISCO (generalized anxiety disorder) ICD-10: F41.1

ICD-9: 300.02             2022                Active

 

                          Slow transit constipation ICD-10: K59.01

ICD-9: 564.01             2022                Active







Medications





          Medication Codes     Instructions Start Date Stop Date Status    Fill 

Instructions

 

                    memantine 10 mg tablet RxNorm: 180396      Take 1 Tablet(s) 

Oral every night at 

bedtime         2022      No Stop Date    Active           

 

                Synthroid 75 mcg tablet RxNorm: 153966  Take 1 Tablet(s) Oral ev

luis morning 

2022          No Stop Date        Active               

 

                Tamiflu 75 mg capsule RxNorm: 463395  Take 1 Capsule(s) Oral two

 times a day 

2022          Active               

 

                benztropine 1 mg tablet RxNorm: 548959  Take 1 Tablet(s) Oral ev

luis day 

2022          No Stop Date        Active               

 

                cefdinir 300 mg capsule RxNorm: 603113  Take 1 Capsule(s) Oral e

very day 

2022          Active              please deliver to [facility]

 

                haloperidol 0.5 mg tablet RxNorm: 304854  Take 1 Tablet(s) Oral 

two times a day 

2022          Active              discontinue the 1mg 

tablets

 

                haloperidol 1 mg tablet RxNorm: 520348  Take 1 Tablet(s) Oral tw

o times a day 

2022          Inactive             

 

                    Probiotic 100 billion cell capsule RxNorm:             Take 

1 Capsule(s) Oral two times a 

day             2022      Active          may substitute f

or any probiotic

 

                    cholecalciferol (vitamin D3) 25 mcg (1,000 unit) capsule RxN

orm: 118294      Take 1 

Capsule(s) Oral every day 2022      No Stop Date    Active           

 

                    benztropine 0.5 mg tablet RxNorm: 740276      Take 1 Tablet(

s) Oral every night at 

bedtime         2022      No Stop Date    Active           

 

                atorvastatin 20 mg tablet RxNorm: 991347  Take 1 Tablet(s) Oral 

every day 

2022          No Stop Date        Active               

 

                    latanoprost 0.005 % eye drops RxNorm: 548868      Instill 1 

Drop(s) ophthalmic (eye) 

every night at bedtime 2022      No Stop Date    Active           

 

                    melatonin 5 mg tablet RxNorm: 819521      Take 1 Tablet(s) O

ral every night at 

bedtime         2022      No Stop Date    Active           

 

             Remeron 15 mg tablet RxNorm: 605150 Take 1 Tablet(s) Oral every day

 2022   

No Stop Date              Active                     

 

                valacyclovir 500 mg tablet RxNorm: 081526  Take 1 Tablet(s) Oral

 every day 

2022          No Stop Date        Active               

 

          Milk of Magnesia oral RxNorm: 6581 oral      2022           Acti

ve     







Medication Administered

No Medication Administered data



Immunizations

No Immunization data



Results

No Results data



Procedures

No Procedures data



Vital Signs





                          Date                      Vital

 

                2022      Blood Pressure 1: 134/80 Code: 8480-6 BMI: 25.0 

Code: 09257-1 Heart 

Rate 1: 80 bpm      Height: 5'3" Code: 8302-2 SpO2: 96%           Temperature: 3

6.0 (C) / 96.8 

(F)                                     Weight: 141 lbs  Code: 14121-9

 

                2022      Blood Pressure 1: 115/68 Code: 8480-6 BMI: 25.0 

Code: 77932-6 Heart 

Rate 1: 79 bpm      Height: 5'3" Code: 8302-2 SpO2: 98%           Temperature: 3

6.2 (C) / 97.2 

(F)                                     Weight: 141 lbs  Code: 92203-4







Functional Status

No Functional Status data



Reason For Visit





                    Reason For Visit    Effective Dates     Notes

 

                    diarrhea            2022           

 

                    well woman exam (65+ years) 2022           







Encounters





             Encounter    Performer    Location     Codes        Date

 

                                        ()  EST. PATIENT, LEVEL IV

Diagnosis: Cerebral vascular accident[ICD10: I63.9]

Diagnosis: Vascular dementia[ICD10: F01.50]

Diagnosis: Resting tremor[ICD10: G25.2]

Diagnosis: Weakness generalized[ICD10: R53.1]

Diagnosis: Cough[ICD10: R05.9] [facility] CPT-4:

 89507

                                        2022

 

                                        ([id number]) OFFICE  VISIT, NEW - LEVEL 4

Diagnosis: Cerebral vascular accident[ICD10: I63.9]

Diagnosis: Vascular dementia[ICD10: F01.50]

Diagnosis: Resting tremor[ICD10: G25.2]

Diagnosis: Slow transit constipation[ICD10: K59.01]

Diagnosis: CISCO (generalized anxiety disorder)[ICD10: F41.1] Yumiko Huynh MD, [facility]          CPT-4: 00432              2022







Plan of Care





             Planned Activity Notes        Codes        Status       Date

 

                          Visit Plan:               CVA/Vascular dementia - obtu

nded during visit. Previously pt was 

not on haldol. Facility med list shows haldol 1mg BID. Family reports unaware of
any behaviors or why pt was on the medication. Decrease haldol to 0.5mg BID to 
see if improves solemence. Family discussed concerns with further stroke. 
Education provided on ruling out other etiologies at this time since symptoms 
are not consistent with new onset CVA. Weakness/Cough - COVID negative. 
Afebrile. Unable to obtain UA while in office. Pt with increased weakness and 
unable to toilet self. Discussed [place]
eed assistance until weakness resolves or pt can be moved to skilled care. Will 
send for CXR, labs and flu swab now. Since unable to obtain UA will also treat 
for possible UTI due to late in the afternoon on a Friday. Cefdinir sent to 
pharmacy. Tremor unchanged.

                                                            2022

 

             Patient Education: Patient Medication Summary                      

     Completed    2022

 

                          Patient Education: cefdinir- [employer] Coupon [id number]

19 

https://www.[employer]/samplemd/resources/getResource/61/549i7vt2-w2q3-4gy2-tp

                                        Completed           2022

 

                          Patient Education: haloperidol- [employer] Coupon 1901

[address] 

https://www.[employer]/samplemd/resources/getResource/61/9610n80w-5119-9400-no

                                        Completed           2022

 

                          Patient Education: Tamiflu- [employer] Coupon [id number]

6 

https://www.[employer]/samplemd/resources/getResource/61/n52x9814-s5i9-0i91-s1

                                        Completed           2022

 

             Patient Education: Patient Medication Summary                      

     Completed    2022

 

                          Visit Plan:               CVA - pt and family not awar

e of the underlying cause of stroke. 

Discussed importance of receiving records from [facility] home and previous 
providers. Discussed normal CVA prevention of blood pressure control, statin 
use, and anticoagulant use. Nephew is pt POA now and unsure of the history as he
has recently overtaken pt care. Pt in the process of moving from [facility] in [place] to [facility], plan for 2022. Will obtain fasting
labs and previous records Vascular Dementia with anxiety/behaviors- secondary to
stroke. Family denies recent decline or changes in memory. Over 8 months ago had
significant behavioral issues and insomnia, was hospitalized and started on 
Remeron. Resolution of symptoms with Remeron. Resting Tremor - pt and nephew 
deny history of diagnosis of Parkinson's disease. Reports having resting tremor 
for some time. Discussed need to obtain previous records before proceeding with 
treatment. 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 
Constipation - no current issue, but recently with issue resolved with MOM. 
Encouraged increased water and fiber intake. Preventative Care - pt memory poor,
poor historian of medical care and unsure when last PAP, mammogram, labs, DXA, 
or colonoscopy was. Discussed obtaining previous records and will discuss at 
next visit. Michelle WILLIAMSON - [phone] cell at [facility]

                                                            2022

 

                                        Appointment: Yumiko Barron 

WPtel:[phone]

                                        [address] [place], [place] [id number]

                                              New Patient     2022

 

             Patient Education: Patient Medication Summary                      

     Completed    2022

 

             Care Plan: Lipid                           Pending      2022

 

                    Care Plan: %Hba1C                       LOINC : 86064-8

                          Pending                   2022

 

             Care Plan: Cbc With Differential                           Pending 

     2022

 

             Care Plan: Comp Metabolic                           Pending      

 

             Care Plan: B12                           Pending      2022

 

             Care Plan: Tsh                           Pending      2022







Instructions





                          Comment                   Date

 

                                        STRAIGHT CATH FOR U&A AND CULTURE, LABS 

AT HOSPITAL

PORTABLE CHEST XRAY

PROBIOTIC

DECREASE HALDOL TO 1/2 TABLET TWICE A DAY

                                        . CVA/Vascular dementia - obtunded durin

g visit. Previously pt was not on 

haldol. Facility med list shows haldol 1mg BID. Family reports unaware of any 
behaviors or why pt was on the medication. Decrease haldol to 0.5mg BID to see 
if improves solemence. Family discussed concerns with further stroke. Education 
provided on ruling out other etiologies at this time since symptoms are not 
consistent with new onset CVA. 



Weakness/Cough - COVID negative. Afebrile. Unable to obtain UA while in office. 
Pt with increased weakness and unable to toilet self. Discussed with [place] that
[place] will need assistance until weakness resolves or pt can be
moved to skilled care. Will send for CXR, labs and flu swab now. Since unable to
obtain UA will also treat for possible UTI due to late in the afternoon on a Fr
iday. Cefdinir sent to pharmacy. 



Tremor unchanged. 

                                        2022

 

                                        SIGN RELEASE OF RECORDS TO PREVIOUS HOSP

ITAL AND PHYSICIAN - [place], DR RAINA SMITH

FASTING LABS ORDERS GIVEN TO PT TO HAVE DRAWN AT [facility] [place]

                                        . CVA - pt and family not aware of the u

nderlying cause of stroke. Discussed 

importance of receiving records from [facility] and previous providers. 
Discussed normal CVA prevention of blood pressure control, statin use, and 
anticoagulant use. Nephew is pt POA now and unsure of the history as he has 
recently overtaken pt care. Pt in the process of moving from [facility] 
in [place] to [facility], plan for 2022.  Will obtain fasting labs
and previous records



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 Family denies 
recent decline or changes in memory. Over 8 months ago had significant 
behavioral issues and insomnia, was hospitalized and started on Remeron. 
Resolution of symptoms with Remeron. 



Resting Tremor - pt and nephew deny history of diagnosis of Parkinson's disease.
Reports having resting tremor for some time. Discussed need to obtain previous 
records before proceeding with treatment. 



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 



Constipation - no current issue, but recently with issue resolved with MOM. 
Encouraged increased water and fiber intake.



Preventative Care - pt memory poor, poor historian of medical care and unsure 
when last PAP, mammogram, labs, DXA, or colonoscopy was. Discussed obtaining 
previous records and will discuss at next visit. 





Michelle WILLIAMSON - [phone] cell at [facility]



                                        2022







Medical Equipment

No Medical Equipment data



Health Concerns Section

Health Concerns data not found



Goals Section

Goals data not found



Interventions Section

Interventions data not found



Health Status Evaluations/Outcomes Section

Health Status Evaluations/Outcomes data not found



Advance Directives

No Advance Directive data

## 2022-04-06 ENCOUNTER — HOSPITAL ENCOUNTER (EMERGENCY)
Dept: HOSPITAL 75 - ER | Age: 72
Discharge: HOME | End: 2022-04-06
Payer: MEDICARE

## 2022-04-06 VITALS — SYSTOLIC BLOOD PRESSURE: 134 MMHG | DIASTOLIC BLOOD PRESSURE: 63 MMHG

## 2022-04-06 VITALS — SYSTOLIC BLOOD PRESSURE: 103 MMHG | DIASTOLIC BLOOD PRESSURE: 52 MMHG
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
## 2022-04-06 DIAGNOSIS — R19.7: ICD-10-CM

## 2022-04-06 DIAGNOSIS — S00.83XA: Primary | ICD-10-CM

## 2022-04-06 LAB
ALBUMIN SERPL-MCNC: 3.5 GM/DL (ref 3.2–4.5)
ALP SERPL-CCNC: 77 U/L (ref 40–136)
ALT SERPL-CCNC: 54 U/L (ref 0–55)
APTT PPP: YELLOW S
BACTERIA #/AREA URNS HPF: (no result) /HPF
BASOPHILS # BLD AUTO: 0 10^3/UL (ref 0–0.1)
BASOPHILS NFR BLD AUTO: 0 % (ref 0–10)
BILIRUB SERPL-MCNC: 0.4 MG/DL (ref 0.1–1)
BILIRUB UR QL STRIP: NEGATIVE
BUN/CREAT SERPL: 16
CALCIUM SERPL-MCNC: 8.4 MG/DL (ref 8.5–10.1)
CHLORIDE SERPL-SCNC: 109 MMOL/L (ref 98–107)
CO2 SERPL-SCNC: 17 MMOL/L (ref 21–32)
CREAT SERPL-MCNC: 0.77 MG/DL (ref 0.6–1.3)
EOSINOPHIL # BLD AUTO: 0.1 10^3/UL (ref 0–0.3)
EOSINOPHIL NFR BLD AUTO: 1 % (ref 0–10)
FIBRINOGEN PPP-MCNC: CLEAR MG/DL
GFR SERPLBLD BASED ON 1.73 SQ M-ARVRAT: 82 ML/MIN
GLUCOSE SERPL-MCNC: 107 MG/DL (ref 70–105)
GLUCOSE UR STRIP-MCNC: NEGATIVE MG/DL
HCT VFR BLD CALC: 39 % (ref 35–52)
HGB BLD-MCNC: 12.7 G/DL (ref 11.5–16)
KETONES UR QL STRIP: NEGATIVE
LEUKOCYTE ESTERASE UR QL STRIP: NEGATIVE
LYMPHOCYTES # BLD AUTO: 1.4 10^3/UL (ref 1–4)
LYMPHOCYTES NFR BLD AUTO: 18 % (ref 12–44)
MAGNESIUM SERPL-MCNC: 1.8 MG/DL (ref 1.6–2.4)
MANUAL DIFFERENTIAL PERFORMED BLD QL: NO
MCH RBC QN AUTO: 32 PG (ref 25–34)
MCHC RBC AUTO-ENTMCNC: 33 G/DL (ref 32–36)
MCV RBC AUTO: 97 FL (ref 80–99)
MONOCYTES # BLD AUTO: 1.2 10^3/UL (ref 0–1)
MONOCYTES NFR BLD AUTO: 15 % (ref 0–12)
NEUTROPHILS # BLD AUTO: 5 10^3/UL (ref 1.8–7.8)
NEUTROPHILS NFR BLD AUTO: 65 % (ref 42–75)
NITRITE UR QL STRIP: NEGATIVE
PH UR STRIP: 6 [PH] (ref 5–9)
PLATELET # BLD: 250 10^3/UL (ref 130–400)
PMV BLD AUTO: 9 FL (ref 9–12.2)
POTASSIUM SERPL-SCNC: 3.4 MMOL/L (ref 3.6–5)
PROT SERPL-MCNC: 6 GM/DL (ref 6.4–8.2)
PROT UR QL STRIP: (no result)
RBC #/AREA URNS HPF: (no result) /HPF
RENAL EPI CELLS #/AREA URNS HPF: (no result) /HPF
SODIUM SERPL-SCNC: 139 MMOL/L (ref 135–145)
SP GR UR STRIP: >=1.03 (ref 1.02–1.02)
SQUAMOUS #/AREA URNS HPF: (no result) /HPF
WBC # BLD AUTO: 7.7 10^3/UL (ref 4.3–11)
WBC #/AREA URNS HPF: (no result) /HPF

## 2022-04-06 PROCEDURE — 93041 RHYTHM ECG TRACING: CPT

## 2022-04-06 PROCEDURE — 83735 ASSAY OF MAGNESIUM: CPT

## 2022-04-06 PROCEDURE — 81000 URINALYSIS NONAUTO W/SCOPE: CPT

## 2022-04-06 PROCEDURE — 85025 COMPLETE CBC W/AUTO DIFF WBC: CPT

## 2022-04-06 PROCEDURE — 72125 CT NECK SPINE W/O DYE: CPT

## 2022-04-06 PROCEDURE — 36415 COLL VENOUS BLD VENIPUNCTURE: CPT

## 2022-04-06 PROCEDURE — 70450 CT HEAD/BRAIN W/O DYE: CPT

## 2022-04-06 PROCEDURE — 72170 X-RAY EXAM OF PELVIS: CPT

## 2022-04-06 PROCEDURE — 80053 COMPREHEN METABOLIC PANEL: CPT

## 2022-04-06 NOTE — DIAGNOSTIC IMAGING REPORT
INDICATION: Fall with pelvic pain.



EXAMINATION: AP pelvis was obtained at 7:43 p.m.



FINDINGS: No fracture or acute bony abnormality is seen.



IMPRESSION: Negative pelvis. 



Dictated by: 



  Dictated on workstation # [id number]

## 2022-04-06 NOTE — DIAGNOSTIC IMAGING REPORT
PROCEDURE: CT head and CT cervical spine without contrast.



TECHNIQUE: Multiple contiguous axial images were obtained through

the brain and cervical spine without the use of intravenous

contrast. Sagittal and coronal reformations through the cervical

spine were then performed. Auto Exposure Controls were utilized

during the CT exam to meet ALARA standards for radiation dose

reduction. 



INDICATION: Fall, head and neck injury.



COMPARISON: 01/23/2022.



FINDINGS:



CT HEAD: The ventricles and cortical sulci are prominent, likely

from generalized parenchymal volume loss and stable since the

prior exam. There is encephalomalacia in the right occipital

lobe. There are findings of chronic microvascular disease. There

is no CT evidence of acute territorial ischemia. There is no

midline shift or mass effect. No acute intracranial hemorrhage is

seen. The calvarium appears intact. Visualized paranasal sinuses

are clear.



CT CERVICAL SPINE: There is trace retrolisthesis at C4-C5. There

is mildly exaggerated lordosis of the cervical spine. There are

moderate to severe degenerative changes at C4-C5 with mild

degenerative changes elsewhere in the cervical spine. There is

multilevel facet arthropathy. No acute fracture is seen. Bony

fragments or hyperdense fluid collections are not seen in the

spinal canal. Surrounding soft tissues demonstrate no acute

abnormality.



IMPRESSION:

1. No acute intracranial hemorrhage or calvarium fracture.

Chronic intracranial findings appear stable since the prior exam.

2. Degenerative changes in the cervical spine with no acute

fracture seen.



Dictated by: 



  Dictated on workstation # [id number]

## 2022-04-06 NOTE — ED FALL/INJURY
General


Chief Complaint:  Trauma-Non Activation


Stated Complaint:  FALL


Nursing Triage Note:  


PT TO [facility] BY [facility] WITH C/O A WITNESSED FALL BY STAFF. STAFF SAID SHE


FELL FROM STANDING ONTO HER L SIDE AND HIT HER HEAD ON THE L SIDE


 (STACY HOOD)


Source:  patient, nursing home records, other (Nursing home staff)


Exam Limitations:  other (Dementia)


 (DREA SCOTT MD)


History of Present Illness


Date Seen by Provider:  2022


Time Seen by Provider:  19:02


Initial Comments


Mrs. Reyes is a 70yo female with PMH dementia, schizophrenia, and anxiety who

presents to ED via EMS due to fall. She had a witnessed fall onto her left side.

She hit her head, no LOC. Pt states she remembers the fall but she is not 

oriented. She complains of some pain on the L side of her head. She also c

omplains of some pain in her R hip. She is not a good historian due to her 

mental status. She does not have any other complaints. 


 (STACY HOOD)


Initial Comments


Patient has a contusion above her left eye.  There are no other obvious 

injuries.  Patient has had recent diarrhea and reportedly had 2 L of IVF 

administered yesterday.  She is presently on cefdinir for unknown infection.


 (DREA SCOTT MD)





Allergies and Home Medications


Allergies


Coded Allergies:  


     No Known Drug Allergies (Unverified , 22)





Patient Home Medication List


Home Medication List Reviewed:  Yes


 (DREA SCOTT MD)





Review of Systems


Review of Systems


Constitutional:  No chills, No fever


Eyes:  Denies Blindness, Denies Blurred Vision


Ears, Nose, Mouth, Throat:  denies mouth pain, denies loose teeth


Respiratory:  No cough, No short of breath


Cardiovascular:  No chest pain, No palpitations


Gastrointestinal:  No abdominal pain, No nausea, No vomiting


Musculoskeletal:  joint pain (R hip); No joint swelling


Skin:  No lesions, No rash (STACY HOOD)





Past Medical-Social-Family Hx


Patient Social History


Tobacco Use?:  No


Use of E-Cig and/or Vaping dev:  No


Substance use?:  No


Alcohol Use?:  No


Pt feels they are or have been:  No


 (STACY HOOD)





Immunizations Up To Date


Influenza Vaccine Up-to-Date:  Yes; Up-to-Date


 (STACY HOOD MED STUDENT)





Past Medical History


Surgery/Hospitalization HX:  


DEMENTIA, ANXIETY, WEAKNESS, DEPRESSION, TREMORS


 (STACY HOOD MED MARLO)





Physical Exam


Vital Signs





Vital Signs - First Documented








 22





 19:08


 


Temp 36.6


 


Pulse 72


 


Resp 16


 


B/P (MAP) 131/82 (98)





 (DREA SCOTT MD)


Vital Signs


Capillary Refill :  


 (STACY HOOD MED STUDENT)


Height, Weight, BMI


Height: '"


Weight: lbs. oz. kg; 26.00 BMI


Method:


 


 (STACY HOOD Who What Wear MARLO)


General Appearance:  WD/WN, no apparent distress


HEENT:  PERRL/EOMI, other (No dental injury.  Contusion above left eye)


Neck:  other (C-collar in place.  No tenderness)


Cardiovascular:  regular rate, rhythm, no edema, no murmur


Respiratory:  lungs clear, normal breath sounds, no respiratory distress


Gastrointestinal:  non tender, soft; No distended


Extremities:  normal inspection, no pedal edema


Neurologic/Psychiatric:  alert, normal mood/affect, other (Generalized tremor)


Skin:  normal color, warm/dry, other (Contusion lateral of left brow) 

(DREA SCOTT MD)





Tara Coma Score


Best Eye Response:  (4) Open Spontaneously


Best Verbal Response:  (4) Confused Conversation


Best Motor Response:  (6) Obeys Commands


Melba Total:  14


 (DREA SCOTT MD)





Progress/Results/Core Measures


Results/Orders


Lab Results





Laboratory Tests








Test


 22


19:53 22


20:20 Range/Units


 


 


Urine Color YELLOW    


 


Urine Clarity CLEAR    


 


Urine pH 6.0   5-9  


 


Urine Specific Gravity >=1.030   1.016-1.022  


 


Urine Protein TRACE H  NEGATIVE  


 


Urine Glucose (UA) NEGATIVE   NEGATIVE  


 


Urine Ketones NEGATIVE   NEGATIVE  


 


Urine Nitrite NEGATIVE   NEGATIVE  


 


Urine Bilirubin NEGATIVE   NEGATIVE  


 


Urine Urobilinogen 0.2   < = 1.0  MG/DL


 


Urine Leukocyte Esterase NEGATIVE   NEGATIVE  


 


Urine RBC (Auto) TRACE-I H  NEGATIVE  


 


Urine RBC 0-2    /HPF


 


Urine WBC RARE    /HPF


 


Urine Squamous Epithelial


Cells RARE 


 


  /HPF





 


Urine Renal Epithelial Cells NONE    /HPF


 


Urine Crystals NONE    /LPF


 


Urine Bacteria TRACE    /HPF


 


Urine Casts NONE    /LPF


 


Urine Mucus NEGATIVE    /LPF


 


Urine Culture Indicated NO    


 


White Blood Count


 


 7.7 


 4.3-11.0


10^3/uL


 


Red Blood Count


 


 3.99 


 3.80-5.11


10^6/uL


 


Hemoglobin  12.7  11.5-16.0  g/dL


 


Hematocrit  39  35-52  %


 


Mean Corpuscular Volume  97  80-99  fL


 


Mean Corpuscular Hemoglobin  32  25-34  pg


 


Mean Corpuscular Hemoglobin


Concent 


 33 


 32-36  g/dL





 


Red Cell Distribution Width  14.1  10.0-14.5  %


 


Platelet Count


 


 250 


 130-400


10^3/uL


 


Mean Platelet Volume  9.0  9.0-12.2  fL


 


Immature Granulocyte % (Auto)  0   %


 


Neutrophils (%) (Auto)  65  42-75  %


 


Lymphocytes (%) (Auto)  18  12-44  %


 


Monocytes (%) (Auto)  15 H 0-12  %


 


Eosinophils (%) (Auto)  1  0-10  %


 


Basophils (%) (Auto)  0  0-10  %


 


Neutrophils # (Auto)


 


 5.0 


 1.8-7.8


10^3/uL


 


Lymphocytes # (Auto)


 


 1.4 


 1.0-4.0


10^3/uL


 


Monocytes # (Auto)


 


 1.2 H


 0.0-1.0


10^3/uL


 


Eosinophils # (Auto)


 


 0.1 


 0.0-0.3


10^3/uL


 


Basophils # (Auto)


 


 0.0 


 0.0-0.1


10^3/uL


 


Immature Granulocyte # (Auto)


 


 0.0 


 0.0-0.1


10^3/uL


 


Sodium Level  139  135-145  MMOL/L


 


Potassium Level  3.4 L 3.6-5.0  MMOL/L


 


Chloride Level  109 H   MMOL/L


 


Carbon Dioxide Level  17 L 21-32  MMOL/L


 


Anion Gap  13  5-14  MMOL/L


 


Blood Urea Nitrogen  12  7-18  MG/DL


 


Creatinine


 


 0.77 


 0.60-1.30


MG/DL


 


Estimat Glomerular Filtration


Rate 


 82 


  





 


BUN/Creatinine Ratio  16   


 


Glucose Level  107 H   MG/DL


 


Calcium Level  8.4 L 8.5-10.1  MG/DL


 


Corrected Calcium  8.8  8.5-10.1  MG/DL


 


Magnesium Level  1.8  1.6-2.4  MG/DL


 


Total Bilirubin  0.4  0.1-1.0  MG/DL


 


Aspartate Amino Transf


(AST/SGOT) 


 41 H


 5-34  U/L





 


Alanine Aminotransferase


(ALT/SGPT) 


 54 


 0-55  U/L





 


Alkaline Phosphatase  77    U/L


 


Total Protein  6.0 L 6.4-8.2  GM/DL


 


Albumin  3.5  3.2-4.5  GM/DL





 (DREA SCOTT MD)


My Orders





Orders - DREA SCOTT MD


Ct Head/Cervical Spine Wo (22 19:09)


Pelvis (22 19:09)


Ua Culture If Indicated (22 19:09)


Cbc With Automated Diff (22 19:15)


Comprehensive Metabolic Panel (22 19:15)


Magnesium (22 19:15)


Ed Iv/Invasive Line Start (22 19:15)


Monitor-Rhythm Ecg Trace Only (22 19:15)


Orthostatic Vital Signs (Adult (22 19:15)


 (DREA SCOTT MD)


Vital Signs/I&O











 22





 19:08 20:40


 


Temp 36.6 


 


Pulse 72 79





  82





  94


 


Resp 16 


 


B/P (MAP) 131/82 (98) 108/57 (74)





  101/52 (68)





  103/56 (72)





 (DREA SCOTT MD)





Progress


Progress Note #1:  


   Time:  20:38


Progress Note


Orthostatic blood pressures were unremarkable.  UA shows no evidence of pyuria. 

Imaging studies showed no injury.  Labs are pending.


Progress Note #2:  


   Time:  20:49


Progress Note


Patient is stable for discharge.  There were no major concerns with the labs.  

See discharge instructions.


 (DREA SCOTT MD)





Diagnostic Imaging





   Diagonstic Imaging:  Xray


   Plain Films/CT/US/NM/MRI:  pelvis


Comments


Pelvis x-ray viewed by me and report reviewed.  See report below:





NAME:   ARTHUR REYES  


MED REC#:   [id number]  


ACCOUNT#:   [id number]  


PT STATUS:   REG ER  


:   1950  


PHYSICIAN:   DREA SCOTT MD  


ADMIT DATE:   22/ER  


                                                                     

***Signed***  


Date of Exam:22  





PELVIS  





INDICATION: Fall with pelvic pain.  





 EXAMINATION: AP pelvis was obtained at 7:43 p.m.  





 FINDINGS: No fracture or acute bony abnormality is seen.  





 IMPRESSION: Negative pelvis.   





 Dictated by:   





   Dictated on workstation # [id number]  





Dict:   22  


Trans:   22  


E [phone]  





Interpreted by:     ROLA LICONA MD  


Electronically signed by: ROLA LICONA MD 22








   Diagonstic Imaging:  CT


   Plain Films/CT/US/NM/MRI:  c-spine, head


Comments


CT head and C-spine viewed by me and report reviewed.  See report below:





NAME:   ARTHUR REYES  


MED REC#:   [id number]  


ACCOUNT#:   [id number]  


PT STATUS:   REG ER  


:   1950  


PHYSICIAN:   DREA SCOTT MD  


ADMIT DATE:   22/ER  


                                                                      

***Draft***  


Date of Exam:22  





CT HEAD/CERVICAL SPINE WO  








PROCEDURE: CT head and CT cervical spine without contrast.  





 TECHNIQUE: Multiple contiguous axial images were obtained through  


 the brain and cervical spine without the use of intravenous  


 contrast. Sagittal and coronal reformations through the cervical  


 spine were then performed. Auto Exposure Controls were utilized  


 during the CT exam to meet ALARA standards for radiation dose  


 reduction.   





 INDICATION: Fall, head and neck injury.  





 COMPARISON: 2022.  





 FINDINGS:  





 CT HEAD: The ventricles and cortical sulci are prominent, likely  


 from generalized parenchymal volume loss and stable since the  


 prior exam. There is encephalomalacia in the right occipital  


 lobe. There are findings of chronic microvascular disease. There  


 is no CT evidence of acute territorial ischemia. There is no  


 midline shift or mass effect. No acute intracranial hemorrhage is  


 seen. The calvarium appears intact. Visualized paranasal sinuses  


 are clear.  





 CT CERVICAL SPINE: There is trace retrolisthesis at C4-C5. There  


 is mildly exaggerated lordosis of the cervical spine. There are  


 moderate to severe degenerative changes at C4-C5 with mild  


 degenerative changes elsewhere in the cervical spine. There is  


 multilevel facet arthropathy. No acute fracture is seen. Bony  


 fragments or hyperdense fluid collections are not seen in the  


 spinal canal. Surrounding soft tissues demonstrate no acute  


 abnormality.  





 IMPRESSION:  


 1. No acute intracranial hemorrhage or calvarium fracture.  


 Chronic intracranial findings appear stable since the prior exam.  


 2. Degenerative changes in the cervical spine with no acute  


 fracture seen.  





   Dictated on workstation # [id number]  








Dict:   22  


Trans:   22  


[facility] [id number]  





Interpreted by:     PABLO RAMSEY MD  


 (DREA SCOTT MD)





Departure


Impression





   Primary Impression:  


   Fall on same level


   Qualified Codes:  W18.30XA - Fall on same level, unspecified, initial 

   encounter


   Additional Impressions:  


   Forehead contusion


   Qualified Codes:  S00.83XA - Contusion of other part of head, initial 

   encounter


   Diarrhea


   Qualified Codes:  R19.7 - Diarrhea, unspecified


Disposition:  01 HOME, SELF-CARE


Condition:  Stable





Departure-Patient Inst.


Referrals:  


SHIVANI MONROE MD (PCP/Family)


Primary Care Physician


Patient Instructions:  Contusion (DC)





Add. Discharge Instructions:  


Encourage plenty of clear liquids to stay well-hydrated.  Avoid milk products 

until diarrhea resolves.


Please notify PCP of the event in the morning.


Call PCP with questions or concerns.


Rise and ambulate only with assist.


Return to care if there are worsening symptoms.





All discharge instructions reviewed with patient and/or family. Voiced 

understanding.





Medical Student Attestation and Attending Note:





I have personally interviewed and examined this patient along with Stacy Hood, MS 4. I have reviewed student documentation including history, 

physical, and assessments.  I agree with the documentation except where 

otherwise noted.





Exam:


General: Alert, disoriented secondary to dementia, no acute distress, well 

developed


HEENT: Normocephalic, contusion above the left eye


Neck: C-collar in place, no tenderness


Heart: Regular rate and rhythm without murmur


Lungs: Clear to auscultation bilaterally with normal effort


Extremities: No serious injuries identified.  Range of motion in the lower 

extremities and hips appears intact.


Abdomen: Soft, nontender, nondistended, normal bowel sounds


Neuropsych: Alert, oriented, no focal deficits


Skin: Warm and dry without rashes


 (DREA SCOTT MD)





Copy


Copies To 1:   SHIVANI MONROE MD, DEREK MED STUDENT       2022 19:11


DREA SCOTT MD         2022 20:07

## 2022-11-27 ENCOUNTER — HOSPITAL ENCOUNTER (EMERGENCY)
Dept: HOSPITAL 75 - ER | Age: 72
Discharge: HOME | End: 2022-11-27
Payer: MEDICARE

## 2022-11-27 VITALS — HEIGHT: 66.02 IN | BODY MASS INDEX: 20.9 KG/M2 | WEIGHT: 130.07 LBS

## 2022-11-27 VITALS — SYSTOLIC BLOOD PRESSURE: 99 MMHG | DIASTOLIC BLOOD PRESSURE: 60 MMHG
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
## 2022-11-27 DIAGNOSIS — S01.21XA: Primary | ICD-10-CM

## 2022-11-27 PROCEDURE — 99283 EMERGENCY DEPT VISIT LOW MDM: CPT

## 2022-11-27 NOTE — ED FALL/INJURY
General


Chief Complaint:  Trauma-Non Activation


Stated Complaint:  FALL


Source:  nursing home records


Exam Limitations:  physical impairment (dementia)





History of Present Illness


Date Seen by Provider:  Nov 27, 2022


Time Seen by Provider:  10:39


Initial Comments


Patient is a 72-year-old female who presents to the emergency department from a 

local nursing home after a fall out of bed.  Patient reportedly rolled out of 

bed and had a brief period of poor responsiveness.  She does have a history of 

dementia.  According to nursing home records she has a history of tremors.  

Nursing home reported that she was shaking more than normal.  She is alert to 

self.  She is able to answer yes and no questions.  She does not follow commands

consistently.  She will squeeze my hand when asked, she will move her feet and 

arms but she will not track light with the otoscope.  She reports pain to her 

nasal bridge.





Vital signs are stable at presentation.  She has a small 1 cm curvilinear 

laceration over the bridge of the nose that is tender to the touch.  Small 

contusion to the forehead just above the nasal bridge to the right.  Moves all 

extremities equally.  Hand overhand palpation over the entirety reveals no point

tenderness.





Patient is not on blood thinners.


Location Injury Occurred:  nursing home


Occurred:  just prior to arrival


Severity:  mild


Injuries/Pain Location:  face


Context:  lost balance


Loss of Consciousness:  unsure





Allergies and Home Medications


Allergies


Coded Allergies:  


     No Known Drug Allergies (Unverified , 1/23/22)





Patient Home Medication List


Home Medication List Reviewed:  Yes





Review of Systems


Review of Systems


Constitutional:  see HPI


ROS limited secondary to patient's dementia





Past Medical-Social-Family Hx


Patient Social History


Tobacco Use?:  No


Use of E-Cig and/or Vaping dev:  No


Substance use?:  No


Alcohol Use?:  No





Immunizations Up To Date


First/Initial COVID19 Vaccinat:  UNK


Second COVID19 Vaccination Taz:  UNK


Third COVID19 Vaccination Date:  UNK


COVID19 Vaccine :  UNK





Past Medical History


Surgery/Hospitalization HX:  


DEMENTIA, ANXIETY, WEAKNESS, DEPRESSION, TREMORS, HYPOTHYROIDISM, COMMUNICATION 


DEFICIT, HYPERLIPIDEMIA, SCHIZOPHRENIA, TIA





Physical Exam


Vital Signs





Vital Signs - First Documented








 11/27/22





 10:30


 


Temp 35.6


 


Pulse 66


 


Resp 20


 


B/P (MAP) 111/95 (100)


 


Pulse Ox 99


 


O2 Delivery Room Air





Capillary Refill :


Height, Weight, BMI


Height: '"


Weight: lbs. oz. kg; 26.00 BMI


Method:


General Appearance:  WD/WN, no apparent distress


HEENT:  TMs normal, other (small curvilinear laceration over the nasal bridge;  

oozing a little blood; surrounding ecchymoses)


Neck:  non-tender, normal inspection; No tender lateral, No tender midline


Cardiovascular:  regular rate, rhythm


Respiratory:  lungs clear, normal breath sounds, no respiratory distress, no 

accessory muscle use


Gastrointestinal:  non tender, soft


Extremities:  normal range of motion, normal inspection, no calf tenderness


Neurologic/Psychiatric:  alert, depressed affect, disoriented x 3


Skin:  normal color, warm/dry, other (as above with small contusion to forehead 

to the right of midline above the nasal bridge)





Chapman Coma Score


Best Eye Response:  (4) Open Spontaneously


Best Verbal Response:  (4) Confused Conversation


Best Motor Response:  (6) Obeys Commands


Chapman Total:  14





Progress/Results/Core Measures


Results/Orders


My Orders





Orders - FÁTIMA PHAM MD


Acetaminophen  Tablet (Tylenol  Tablet) (11/27/22 11:15)





Medications Given in ED





Current Medications








 Medications  Dose


 Ordered  Sig/Cely


 Route  Start Time


 Stop Time Status Last Admin


Dose Admin


 


 Acetaminophen  1,000 mg  ONCE  ONCE


 PO  11/27/22 11:15


 11/27/22 11:16 DC 11/27/22 11:18


1,000 MG








Vital Signs/I&O











 11/27/22





 10:30


 


Temp 35.6


 


Pulse 66


 


Resp 20


 


B/P (MAP) 111/95 (100)


 


Pulse Ox 99


 


O2 Delivery Room Air











Progress


Progress Note :  


   Time:  11:27





Departure


Impression





   Primary Impression:  


   Facial contusion


   Qualified Codes:  S00.83XA - Contusion of other part of head, initial 

   encounter


   Additional Impression:  


   Nasal laceration


   Qualified Codes:  S01.21XA - Laceration without foreign body of nose, initial

   encounter


Disposition:  01 HOME, SELF-CARE


Condition:  Stable





Departure-Patient Inst.


Decision time for Depature:  11:26


Referrals:  


SHIVANI MONROE MD (PCP/Family)


Primary Care Physician


Patient Instructions:  Skin Glue for Minor Cuts, Minor Head Injury





Add. Discharge Instructions:  


Keep the abrasions on her face clean dry and covered.





The laceration on the nasal bridge was closed with skin glue.  Do not put triple

antibiotic ointment over the glue as it will take it off.





If she has any worsening mental status changes, vomiting, passing out spell or 

any other emergent, concerning symptoms please bring her back to the emergency 

room for reevaluation.





Images


Head/Face











1 - Mild, Laceration


2 - Mild, Abrasion








Copy


Copies To 1:   SHIVANI MONROE MD, KATHRYN M MD         Nov 27, 2022 10:57